# Patient Record
Sex: FEMALE | URBAN - NONMETROPOLITAN AREA
[De-identification: names, ages, dates, MRNs, and addresses within clinical notes are randomized per-mention and may not be internally consistent; named-entity substitution may affect disease eponyms.]

---

## 2018-11-28 ENCOUNTER — APPOINTMENT (RX ONLY)
Dept: URBAN - NONMETROPOLITAN AREA CLINIC 22 | Facility: CLINIC | Age: 43
Setting detail: DERMATOLOGY
End: 2018-11-28

## 2018-11-28 DIAGNOSIS — L259 CONTACT DERMATITIS AND OTHER ECZEMA, UNSPECIFIED CAUSE: ICD-10-CM

## 2018-11-28 PROBLEM — L23.9 ALLERGIC CONTACT DERMATITIS, UNSPECIFIED CAUSE: Status: ACTIVE | Noted: 2018-11-28

## 2018-11-28 PROCEDURE — 99202 OFFICE O/P NEW SF 15 MIN: CPT

## 2018-11-28 PROCEDURE — ? TREATMENT REGIMEN

## 2018-11-28 PROCEDURE — ? COUNSELING

## 2018-11-28 PROCEDURE — ? PRESCRIPTION

## 2018-11-28 RX ORDER — TRIAMCINOLONE ACETONIDE 1 MG/G
0.1% CREAM TOPICAL BID
Qty: 1 | Refills: 2 | Status: ERX | COMMUNITY
Start: 2018-11-28

## 2018-11-28 RX ADMIN — TRIAMCINOLONE ACETONIDE 0.1%: 1 CREAM TOPICAL at 00:00

## 2018-11-28 ASSESSMENT — LOCATION DETAILED DESCRIPTION DERM
LOCATION DETAILED: RIGHT LATERAL SUPERIOR CHEST
LOCATION DETAILED: LEFT LATERAL SUPERIOR CHEST
LOCATION DETAILED: LEFT SUPERIOR MEDIAL BUCCAL CHEEK
LOCATION DETAILED: RIGHT ANTERIOR MEDIAL DISTAL UPPER ARM
LOCATION DETAILED: LEFT ANTERIOR MEDIAL PROXIMAL UPPER ARM
LOCATION DETAILED: RIGHT ANTERIOR MEDIAL PROXIMAL UPPER ARM
LOCATION DETAILED: LEFT ANTERIOR MEDIAL DISTAL UPPER ARM
LOCATION DETAILED: RIGHT SUPERIOR MEDIAL BUCCAL CHEEK
LOCATION DETAILED: UPPER STERNUM

## 2018-11-28 ASSESSMENT — LOCATION SIMPLE DESCRIPTION DERM
LOCATION SIMPLE: CHEST
LOCATION SIMPLE: RIGHT CHEEK
LOCATION SIMPLE: RIGHT UPPER ARM
LOCATION SIMPLE: LEFT UPPER ARM
LOCATION SIMPLE: LEFT CHEEK

## 2018-11-28 ASSESSMENT — LOCATION ZONE DERM
LOCATION ZONE: FACE
LOCATION ZONE: ARM
LOCATION ZONE: TRUNK

## 2018-11-28 ASSESSMENT — SEVERITY ASSESSMENT: SEVERITY: MILD

## 2018-11-28 NOTE — HPI: RASH
What Type Of Note Output Would You Prefer (Optional)?: Standard Output
How Severe Is Your Rash?: mild
Is This A New Presentation, Or A Follow-Up?: Rash
Additional History: Patient states that she recently developed a rash on the face, arms and chest. She states that she has not changed any topical products that may come into contact with her skin. Patient states that the only different thing that she has done was to turn on her heater. Patient states that this is the first time that a rash such as this has appeared. She states that upon the onset of this rash, she was very red, swollen and itchy. Patient states that she took benadryl and her symptoms have reduced. Patient states that she has many allergies and was prick tested in [place]. Patient states that she did not complete testing as she came to the .

## 2018-11-28 NOTE — PROCEDURE: TREATMENT REGIMEN
Detail Level: Generalized
Plan: Discussed potential diagnosis with patient today. Informed patient that her symptoms seem to be related to an allergen that she is coming into contact with. Potential allergens including aerosol sprays, perfumes, make up and lotions were discussed. Informed patient that a topical steroid can be prescribed at this time to help to resolve her symptoms. Informed patient that this condition could return unless the allergen can be identified. Discussed patch testing with patient in detail. Patient is agreeable to patch testing at this time. Patient was informed that she would need to refrain from oral steroids and antihistamines for at least two weeks prior to patch testing. Informed patient to avoid applying TAC to the back as this is the area in which the patch tests would be placed. Patient voiced understanding and is agreeable to treatment as discussed today. Patient will follow up at a later date for patch test application
Initiate Treatment: -Triamcinolone Acetonide 0.1 % topical cream : Apply to affected areas of itchy/rash on the body BID, one week on, one week off. Avoid armpits and groin

## 2018-12-03 ENCOUNTER — APPOINTMENT (RX ONLY)
Dept: URBAN - NONMETROPOLITAN AREA CLINIC 22 | Facility: CLINIC | Age: 43
Setting detail: DERMATOLOGY
End: 2018-12-03

## 2018-12-03 DIAGNOSIS — L259 CONTACT DERMATITIS AND OTHER ECZEMA, UNSPECIFIED CAUSE: ICD-10-CM

## 2018-12-03 PROBLEM — L23.9 ALLERGIC CONTACT DERMATITIS, UNSPECIFIED CAUSE: Status: ACTIVE | Noted: 2018-12-03

## 2018-12-03 PROCEDURE — ? TREATMENT REGIMEN

## 2018-12-03 PROCEDURE — 95044 PATCH/APPLICATION TESTS: CPT

## 2018-12-03 PROCEDURE — ? COUNSELING

## 2018-12-03 PROCEDURE — ? PATCH TESTING

## 2018-12-03 ASSESSMENT — LOCATION DETAILED DESCRIPTION DERM
LOCATION DETAILED: RIGHT ANTERIOR MEDIAL PROXIMAL UPPER ARM
LOCATION DETAILED: RIGHT SUPERIOR MEDIAL BUCCAL CHEEK
LOCATION DETAILED: LEFT ANTERIOR MEDIAL PROXIMAL UPPER ARM
LOCATION DETAILED: LEFT SUPERIOR MEDIAL BUCCAL CHEEK
LOCATION DETAILED: UPPER STERNUM
LOCATION DETAILED: RIGHT ANTERIOR MEDIAL DISTAL UPPER ARM
LOCATION DETAILED: LEFT SUPERIOR UPPER BACK
LOCATION DETAILED: LEFT SUPERIOR MEDIAL UPPER BACK
LOCATION DETAILED: RIGHT LATERAL SUPERIOR CHEST
LOCATION DETAILED: LEFT LATERAL SUPERIOR CHEST
LOCATION DETAILED: LEFT ANTERIOR MEDIAL DISTAL UPPER ARM
LOCATION DETAILED: RIGHT SUPERIOR UPPER BACK

## 2018-12-03 ASSESSMENT — LOCATION ZONE DERM
LOCATION ZONE: ARM
LOCATION ZONE: TRUNK
LOCATION ZONE: FACE

## 2018-12-03 ASSESSMENT — LOCATION SIMPLE DESCRIPTION DERM
LOCATION SIMPLE: LEFT CHEEK
LOCATION SIMPLE: LEFT UPPER ARM
LOCATION SIMPLE: CHEST
LOCATION SIMPLE: RIGHT CHEEK
LOCATION SIMPLE: RIGHT UPPER ARM
LOCATION SIMPLE: LEFT UPPER BACK
LOCATION SIMPLE: RIGHT UPPER BACK

## 2018-12-03 NOTE — PROCEDURE: PATCH TESTING
Detail Level: Detailed
Consent: Verbal consent obtained, risks reviewed including but not limited to rash, itching, allergic reaction, systemic rash, remote possiblity of anaphylaxis to allergen.
Post-Care Instructions: I reviewed with the patient in detail post-care instructions. Patient should not sweat, pick at, or get the patches wet for 48 hours. Patient supplied with additional tape and skin marker to use if needed.
Number Of Patches (Maximum Allowable Per Dos By Cms Is 90): 36

## 2018-12-03 NOTE — PROCEDURE: TREATMENT REGIMEN
Plan: Patch test application today
Continue Regimen: -Triamcinolone Acetonide 0.1 % topical cream : Apply to affected areas of itchy/rash on the body BID, one week on, one week off. Avoid armpits and groin (Avoid application to the back during testing)
Detail Level: Generalized

## 2018-12-03 NOTE — HPI: TESTING (PATCH TESTING)
Has Your Rash Been Biopsied Before?: has not been biopsied previously
previous_patch_test_true_test
How Severe Is Your Rash At Its Worst?: mild

## 2018-12-05 ENCOUNTER — APPOINTMENT (RX ONLY)
Dept: URBAN - NONMETROPOLITAN AREA CLINIC 22 | Facility: CLINIC | Age: 43
Setting detail: DERMATOLOGY
End: 2018-12-05

## 2018-12-05 DIAGNOSIS — L259 CONTACT DERMATITIS AND OTHER ECZEMA, UNSPECIFIED CAUSE: ICD-10-CM

## 2018-12-05 PROBLEM — L23.9 ALLERGIC CONTACT DERMATITIS, UNSPECIFIED CAUSE: Status: ACTIVE | Noted: 2018-12-05

## 2018-12-05 PROBLEM — J45.909 UNSPECIFIED ASTHMA, UNCOMPLICATED: Status: ACTIVE | Noted: 2018-12-05

## 2018-12-05 PROCEDURE — ? TRUE TEST READING

## 2018-12-05 PROCEDURE — ? TREATMENT REGIMEN

## 2018-12-05 PROCEDURE — ? FOLLOW UP FOR NEXT VISIT

## 2018-12-05 PROCEDURE — ? COUNSELING

## 2018-12-05 ASSESSMENT — LOCATION DETAILED DESCRIPTION DERM
LOCATION DETAILED: LEFT LATERAL SUPERIOR CHEST
LOCATION DETAILED: RIGHT LATERAL SUPERIOR CHEST
LOCATION DETAILED: UPPER STERNUM
LOCATION DETAILED: LEFT ANTERIOR MEDIAL DISTAL UPPER ARM
LOCATION DETAILED: LEFT ANTERIOR MEDIAL PROXIMAL UPPER ARM
LOCATION DETAILED: LEFT SUPERIOR MEDIAL BUCCAL CHEEK
LOCATION DETAILED: RIGHT ANTERIOR MEDIAL DISTAL UPPER ARM
LOCATION DETAILED: RIGHT SUPERIOR MEDIAL BUCCAL CHEEK
LOCATION DETAILED: RIGHT ANTERIOR MEDIAL PROXIMAL UPPER ARM

## 2018-12-05 ASSESSMENT — LOCATION ZONE DERM
LOCATION ZONE: FACE
LOCATION ZONE: TRUNK
LOCATION ZONE: ARM

## 2018-12-05 ASSESSMENT — LOCATION SIMPLE DESCRIPTION DERM
LOCATION SIMPLE: RIGHT UPPER ARM
LOCATION SIMPLE: LEFT CHEEK
LOCATION SIMPLE: LEFT UPPER ARM
LOCATION SIMPLE: RIGHT CHEEK
LOCATION SIMPLE: CHEST

## 2018-12-05 NOTE — PROCEDURE: TREATMENT REGIMEN
Detail Level: Generalized
Continue Regimen: -Triamcinolone Acetonide 0.1 % topical cream : Apply to affected areas of itchy/rash on the body BID, one week on, one week off. Avoid armpits and groin (Avoid application to the back during testing)
Plan: Initial patch test reading today

## 2018-12-05 NOTE — PROCEDURE: FOLLOW UP FOR NEXT VISIT
Scheduled For Follow Up In (Optional): 5 days
Instructions (Optional): Final Patch Reading
Detail Level: Detailed
Other Exam: skin examination

## 2018-12-05 NOTE — PROCEDURE: TRUE TEST READING
34 - Parthenolide: no reaction
20 - P-Phenylenediamine: irritant reaction
What Reading Time Point?: 48 hour
Nickel Sulfate Counseling: The test indicate that you have a contact allergy to nickel. It is used in metal alloys, nickel plating, metal and chemical manufacturing, and the production of batteries and coins. Nickel is often used to coat other metals to give them a shiny metallic finish. Nickel is found on the surface of common metallic and metal- plated items such as metal jewelry, watchbands, keys, tools, equipment, scissors, kitchen utensils coins and clothing fasteners such as buttons, zippers and snaps. It is occasionally found in eye cosmetics. While nickel is found in stainless steel, allergic reactions to products made with stainless steel (for example, dental and surgical instruments) are rare because typically only minimal amounts of nickel are released.\\n\\nWHERE IS NICKEL FOUND?\\[employer], you may find nickel in or around:\\n • Metal alloys\\n• Copper-nickel tubing for salt water\\n• Machine parts\\n• Chemical catalysts\\n• Aluminum electrical joint compounds • Equipment\\n• Orthodontic and dental appliances\\n• Welding and cutting\\n• Nickel plating\\n• Metal-working fluids and oils • Batteries\\n• Dyes\\n• Insecticides\\n \\[employer] home, you may find nickel in or around:\\n• Jewelry\\n• Scissors\\n• Metal utensils\\n• Magnets\\n• Chrome and brass\\n• Metallic powders\\n• Some white or 14-kt gold jewelry • Watchbands\\n• Pigments\\n• Hair ornaments\\n• Eyeglasses\\n• Keys\\n• Batteries\\n• Hand tools\\n• Buttons and snaps\\n• Zippers\\n• Eyeshadow\\n• Coins\\n• Some bronze objects\\n• Some objects with white or 14-kt gold • Knitting needles
Quinoline Mix Counseling: Your T.R.U.E. TEST results indicate that you have a contact allergy to quinoline mix. Quinolines are a group of synthetic antibacterial agents that may be used in combina- tion with corticosteroids to treat skin infections such as eczema, athlete’s foot, jock itch and ringworm. It may also be used as an anti-infective, anti-fungal and anti-bacterial agents to treat gastrointestinal and vaginal infections.\\n\\nQuinoline mix contains the following two allergens:\\n• Clioquinol\\n• Chlorquinaldol\\n\\nWHERE IS QUINOLINE MIX FOUND?\\[employer], you may find quinoline mix in or around: • Antifungals\\n• Antibacterials\\nIf you suspect you are being exposed to this allergen at work, contact your employer regarding Material Safety Data Sheets (MSDS).\\n\\[employer] home, you may find quinoline mix in:\\n• Prescription and nonprescription preparations such as topical antibiotic and\\nantifungal creams, lotions, and ointments\\n• Paste bandages sold in pharmacies for the treatment of wound infections, infected eczema and mycotic skin infections\\n\\nHOW CAN YOU AVOID QUINOLINE MIX?\\n• Check all skin antibacterial agents for quinoline mix ingredients.\\n• Inform your healthcare providers that you are allergic to quinoline mix and ask that they use products that are free from this allergen. Quinoline mix preparations are sometimes used topically in surgical wounds. When a topical antibiotic is required, request a suitable safe alternative.\\n• Use only ingredients that do not list quinoline mix (or synonyms), especially anitbotic preparations\\n\\nWHAT SHOULD YOU LOOK FOR AND AVOID?\\nAvoid products with the following names in the list of ingredients: \\n• Clioquinol\\n• Chlorquinaldol\\n• Chloriodoquin\\n• Iodochlorhydroxyquin \\n• Sterosan\\n• Vioform
Bacitracin Counseling: Your T.R.U.E. TEST results indicate that you have a contact allergy to bacitracin. Bacitracin is a common antibiotic used for postoperative and general wound care, often a first-line topical remedy for virtually all skin injuries and dermatoses as well as for some ear and eye disorders.\\n\\nWHERE IS BACITRACIN FOUND?\\[employer], you may find bacitracin in or around:\\n• Topical antibiotics • Animal feeds\\nIf you suspect you are being exposed to this allergen at work, contact your employer\\nregarding Material Safety Data Sheets (MSDS).\\[employer], you may find bacitracin in:\\n• Prescription and in over-the-counter preparations such as topical antibiotic\\ncreams, lotions, ointments\\n• Paste bandages sold in pharmacies for the treatment of wound infections, infected eczema and mycotic skin infections\\n• Ophthalmic and otic products\\n\\nWHAT SHOULD YOU LOOK FOR AND AVOID?\\nAvoid products with the following names in the list of ingredients: • Bacitracin\\n• Bacitracin A\\n• Mycitracin\\n• Bacitracin zinc salt • [id number] • [id number]\\nWhen purchasing products that may come in contact with your skin, check the list of ingredients for the above names. If in doubt, contact your pharmacist or physician.\\n\\nWHAT ARE SOME PRODUCTS THAT MAY CONTAIN BACITRACIN*?\\n• Double antibiotic ointment\\n• Bactine Antiseptic\\n• Neosporin® First-aid Antibiotic Ointment • Triple antibiotic ointment\\n• Eye drops
Number Of Patches Read: 36
Show Negative Results In The Note?: No
Detail Level: Zone
Gold Sodium Thiosulfate Counseling: Your T.R.U.E. TEST results indicate that you have a contact allergy to gold sodium thiosulfate. This contact allergy may cause your skin to react when it is exposed to this substance, although it may take several days for the symptoms to appear. Typical symptoms include redness, swelling, itching and fluid-filled blisters.\\nGold sodium thiosulfate is a fairly common sensitizer with elicitation of symptoms linked to gold in jewelry, occupational exposure to gold, dental restorations, gold-plated intracoronary stents and previous rheumatoid arthritis treatment.\\n\\nWHERE IS GOLD SODIUM THIOSULFATE OR GOLD FOUND?\\[employer] work, you may find gold sodium thiosulfate or gold in or around: • Electronics\\n• Gold-plating processes\\n• Medical and dental devices or implants\\[employer] home, you may find gold sodium thiosulfate or gold in or around: • Gold or gold-plated jewelry\\n• Rheumatoid arthritis treatment\\n• Dental restorations\\n• Gold-plated intracoronary stents\\n\\nHOW CAN YOU AVOID GOLD SODIUM THIOSULFATE OR GOLD?\\n• Check all gold-appearing jewelry for a stamp that indicates the purity (1-999 or .1-.999) or the karat (10K, 14K, 18K, 22K, or 24K).\\nAlso avoid all gold-plated jewelry.\\n• Inform your healthcare providerss, including dental providers, that you are allergic to gold sodium thiosulfate. Ask that they use products free from this allergen. Gold filling does NOT need to be removed unless significant oral disease is present and associated with a positive patch test.\\n\\nWHAT SHOULD YOU LOOK FOR AND AVOID?\\nAvoid products with the following names in the list of ingredients: • Gold sodium thiosulfate\\n• Thiosulfuric acid, gold(1+) sodium salt (2:1:3)\\n• Gold sodium thiosulfate\\n• Gold trisodium bis(thiosulphate)\\n• Thiosulfuric acid (H2S2O3), gold(1+) sodium salt (2:1:3)
Fragrance Mix Counseling: The test indicates that you have a perfume allergy. The fragrance mix consists of eight substances that are common allergen ingredients in fragrances and flavorings. The eight allergens are:\\no Geraniol\\no Cinnamaldehyde\\no Hydroxycitronellal\\no Cinnamyl alcohol\\no Eugenol\\no Isoeugenol\\no Amylcinnamaldehyde o Oak dewey\\nThese substances are derived from plants and synthetic sources.
Show Allergen Counseling In The Note?: Yes

## 2018-12-10 ENCOUNTER — APPOINTMENT (RX ONLY)
Dept: URBAN - METROPOLITAN AREA CLINIC 116 | Facility: CLINIC | Age: 43
Setting detail: DERMATOLOGY
End: 2018-12-10

## 2018-12-10 DIAGNOSIS — L259 CONTACT DERMATITIS AND OTHER ECZEMA, UNSPECIFIED CAUSE: ICD-10-CM

## 2018-12-10 PROBLEM — L23.9 ALLERGIC CONTACT DERMATITIS, UNSPECIFIED CAUSE: Status: ACTIVE | Noted: 2018-12-10

## 2018-12-10 PROCEDURE — ? TREATMENT REGIMEN

## 2018-12-10 PROCEDURE — ? COUNSELING

## 2018-12-10 PROCEDURE — 99213 OFFICE O/P EST LOW 20 MIN: CPT

## 2018-12-10 PROCEDURE — ? TRUE TEST READING

## 2018-12-10 ASSESSMENT — LOCATION DETAILED DESCRIPTION DERM
LOCATION DETAILED: UPPER STERNUM
LOCATION DETAILED: RIGHT SUPERIOR MEDIAL BUCCAL CHEEK
LOCATION DETAILED: RIGHT ANTERIOR MEDIAL PROXIMAL UPPER ARM
LOCATION DETAILED: RIGHT LATERAL SUPERIOR CHEST
LOCATION DETAILED: LEFT ANTERIOR MEDIAL PROXIMAL UPPER ARM
LOCATION DETAILED: LEFT ANTERIOR MEDIAL DISTAL UPPER ARM
LOCATION DETAILED: LEFT SUPERIOR MEDIAL BUCCAL CHEEK
LOCATION DETAILED: RIGHT ANTERIOR MEDIAL DISTAL UPPER ARM
LOCATION DETAILED: LEFT LATERAL SUPERIOR CHEST

## 2018-12-10 ASSESSMENT — LOCATION ZONE DERM
LOCATION ZONE: TRUNK
LOCATION ZONE: FACE
LOCATION ZONE: ARM

## 2018-12-10 ASSESSMENT — LOCATION SIMPLE DESCRIPTION DERM
LOCATION SIMPLE: CHEST
LOCATION SIMPLE: LEFT UPPER ARM
LOCATION SIMPLE: RIGHT CHEEK
LOCATION SIMPLE: LEFT CHEEK
LOCATION SIMPLE: RIGHT UPPER ARM

## 2018-12-10 NOTE — PROCEDURE: TRUE TEST READING
26 - Quinoline Mix: no reaction
36 - 2-Bromo-2-Nitropropane-1,3-Diol (Bronopol): +/-
Bacitracin Counseling: Your T.R.U.E. TEST results indicate that you have a contact allergy to bacitracin. Bacitracin is a common antibiotic used for postoperative and general wound care, often a first-line topical remedy for virtually all skin injuries and dermatoses as well as for some ear and eye disorders.\\n\\nWHERE IS BACITRACIN FOUND?\\[employer], you may find bacitracin in or around:\\n• Topical antibiotics • Animal feeds\\nIf you suspect you are being exposed to this allergen at work, contact your employer\\nregarding Material Safety Data Sheets (MSDS).\\[employer], you may find bacitracin in:\\n• Prescription and in over-the-counter preparations such as topical antibiotic\\ncreams, lotions, ointments\\n• Paste bandages sold in pharmacies for the treatment of wound infections, infected eczema and mycotic skin infections\\n• Ophthalmic and otic products\\n\\nWHAT SHOULD YOU LOOK FOR AND AVOID?\\nAvoid products with the following names in the list of ingredients: • Bacitracin\\n• Bacitracin A\\n• Mycitracin\\n• Bacitracin zinc salt • [id number] • [id number]\\nWhen purchasing products that may come in contact with your skin, check the list of ingredients for the above names. If in doubt, contact your pharmacist or physician.\\n\\nWHAT ARE SOME PRODUCTS THAT MAY CONTAIN BACITRACIN*?\\n• Double antibiotic ointment\\n• Bactine Antiseptic\\n• Neosporin® First-aid Antibiotic Ointment • Triple antibiotic ointment\\n• Eye drops
What Reading Time Point?: 48 hour
Show Allergen Counseling In The Note?: Yes
Nickel Sulfate Counseling: The test indicate that you have a contact allergy to nickel. It is used in metal alloys, nickel plating, metal and chemical manufacturing, and the production of batteries and coins. Nickel is often used to coat other metals to give them a shiny metallic finish. Nickel is found on the surface of common metallic and metal- plated items such as metal jewelry, watchbands, keys, tools, equipment, scissors, kitchen utensils coins and clothing fasteners such as buttons, zippers and snaps. It is occasionally found in eye cosmetics. While nickel is found in stainless steel, allergic reactions to products made with stainless steel (for example, dental and surgical instruments) are rare because typically only minimal amounts of nickel are released.\\n\\nWHERE IS NICKEL FOUND?\\[employer], you may find nickel in or around:\\n • Metal alloys\\n• Copper-nickel tubing for salt water\\n• Machine parts\\n• Chemical catalysts\\n• Aluminum electrical joint compounds • Equipment\\n• Orthodontic and dental appliances\\n• Welding and cutting\\n• Nickel plating\\n• Metal-working fluids and oils • Batteries\\n• Dyes\\n• Insecticides\\n \\[employer] home, you may find nickel in or around:\\n• Jewelry\\n• Scissors\\n• Metal utensils\\n• Magnets\\n• Chrome and brass\\n• Metallic powders\\n• Some white or 14-kt gold jewelry • Watchbands\\n• Pigments\\n• Hair ornaments\\n• Eyeglasses\\n• Keys\\n• Batteries\\n• Hand tools\\n• Buttons and snaps\\n• Zippers\\n• Eyeshadow\\n• Coins\\n• Some bronze objects\\n• Some objects with white or 14-kt gold • Knitting needles
Fragrance Mix Counseling: The test indicates that you have a perfume allergy. The fragrance mix consists of eight substances that are common allergen ingredients in fragrances and flavorings. The eight allergens are:\\no Geraniol\\no Cinnamaldehyde\\no Hydroxycitronellal\\no Cinnamyl alcohol\\no Eugenol\\no Isoeugenol\\no Amylcinnamaldehyde o Oak dewey\\nThese substances are derived from plants and synthetic sources.
Detail Level: Zone
20 - P-Phenylenediamine: 1+
Show Negative Results In The Note?: No
Quinoline Mix Counseling: Your T.R.U.E. TEST results indicate that you have a contact allergy to quinoline mix. Quinolines are a group of synthetic antibacterial agents that may be used in combina- tion with corticosteroids to treat skin infections such as eczema, athlete’s foot, jock itch and ringworm. It may also be used as an anti-infective, anti-fungal and anti-bacterial agents to treat gastrointestinal and vaginal infections.\\n\\nQuinoline mix contains the following two allergens:\\n• Clioquinol\\n• Chlorquinaldol\\n\\nWHERE IS QUINOLINE MIX FOUND?\\[employer] work, you may find quinoline mix in or around: • Antifungals\\n• Antibacterials\\nIf you suspect you are being exposed to this allergen at work, contact your employer regarding Material Safety Data Sheets (MSDS).\\n\\[employer] home, you may find quinoline mix in:\\n• Prescription and nonprescription preparations such as topical antibiotic and\\nantifungal creams, lotions, and ointments\\n• Paste bandages sold in pharmacies for the treatment of wound infections, infected eczema and mycotic skin infections\\n\\nHOW CAN YOU AVOID QUINOLINE MIX?\\n• Check all skin antibacterial agents for quinoline mix ingredients.\\n• Inform your healthcare providers that you are allergic to quinoline mix and ask that they use products that are free from this allergen. Quinoline mix preparations are sometimes used topically in surgical wounds. When a topical antibiotic is required, request a suitable safe alternative.\\n• Use only ingredients that do not list quinoline mix (or synonyms), especially anitbotic preparations\\n\\nWHAT SHOULD YOU LOOK FOR AND AVOID?\\nAvoid products with the following names in the list of ingredients: \\n• Clioquinol\\n• Chlorquinaldol\\n• Chloriodoquin\\n• Iodochlorhydroxyquin \\n• Sterosan\\n• Vioform
Number Of Patches Read: 36
Gold Sodium Thiosulfate Counseling: Your T.R.U.E. TEST results indicate that you have a contact allergy to gold sodium thiosulfate. This contact allergy may cause your skin to react when it is exposed to this substance, although it may take several days for the symptoms to appear. Typical symptoms include redness, swelling, itching and fluid-filled blisters.\\nGold sodium thiosulfate is a fairly common sensitizer with elicitation of symptoms linked to gold in jewelry, occupational exposure to gold, dental restorations, gold-plated intracoronary stents and previous rheumatoid arthritis treatment.\\n\\nWHERE IS GOLD SODIUM THIOSULFATE OR GOLD FOUND?\\[employer], you may find gold sodium thiosulfate or gold in or around: • Electronics\\n• Gold-plating processes\\n• Medical and dental devices or implants\\[employer] home, you may find gold sodium thiosulfate or gold in or around: • Gold or gold-plated jewelry\\n• Rheumatoid arthritis treatment\\n• Dental restorations\\n• Gold-plated intracoronary stents\\n\\nHOW CAN YOU AVOID GOLD SODIUM THIOSULFATE OR GOLD?\\n• Check all gold-appearing jewelry for a stamp that indicates the purity (1-999 or .1-.999) or the karat (10K, 14K, 18K, 22K, or 24K).\\nAlso avoid all gold-plated jewelry.\\n• Inform your healthcare providerss, including dental providers, that you are allergic to gold sodium thiosulfate. Ask that they use products free from this allergen. Gold filling does NOT need to be removed unless significant oral disease is present and associated with a positive patch test.\\n\\nWHAT SHOULD YOU LOOK FOR AND AVOID?\\nAvoid products with the following names in the list of ingredients: • Gold sodium thiosulfate\\n• Thiosulfuric acid, gold(1+) sodium salt (2:1:3)\\n• Gold sodium thiosulfate\\n• Gold trisodium bis(thiosulphate)\\n• Thiosulfuric acid (H2S2O3), gold(1+) sodium salt (2:1:3)

## 2018-12-10 NOTE — PROCEDURE: TREATMENT REGIMEN
Plan: Discussed positive allergens with patient today. Informed patient that a \"safe list\" will be emailed to her to help aid with avoidance of these allergens. Patient voiced understanding
Continue Regimen: -Triamcinolone Acetonide 0.1 % topical cream : Apply to affected areas of itchy/rash on the body BID, one week on, one week off. Avoid armpits and groin (Avoid application to the back during testing)
Detail Level: Generalized